# Patient Record
Sex: MALE | Race: WHITE | NOT HISPANIC OR LATINO | Employment: UNEMPLOYED | URBAN - METROPOLITAN AREA
[De-identification: names, ages, dates, MRNs, and addresses within clinical notes are randomized per-mention and may not be internally consistent; named-entity substitution may affect disease eponyms.]

---

## 2023-05-30 PROBLEM — E78.00 HYPERCHOLESTEREMIA: Status: ACTIVE | Noted: 2023-05-30

## 2023-05-30 PROBLEM — F90.9 ADHD: Status: ACTIVE | Noted: 2023-05-30

## 2023-05-30 PROBLEM — F32.A DEPRESSION: Status: ACTIVE | Noted: 2023-05-30

## 2023-05-30 PROBLEM — F41.1 ANXIETY, GENERALIZED: Status: ACTIVE | Noted: 2023-05-30

## 2023-05-30 PROBLEM — J34.89 NASAL LESION: Status: ACTIVE | Noted: 2023-05-30

## 2023-05-30 PROBLEM — M92.529 OSGOOD-SCHLATTER'S DISEASE: Status: ACTIVE | Noted: 2023-05-30

## 2023-05-30 PROBLEM — F84.0 AUTISM SPECTRUM DISORDER (HHS-HCC): Status: ACTIVE | Noted: 2023-05-30

## 2023-05-30 PROBLEM — F80.2 DEVELOPMENTAL LANGUAGE DISORDER WITH IMPAIRMENT OF RECEPTIVE AND EXPRESSIVE LANGUAGE: Status: ACTIVE | Noted: 2023-05-30

## 2023-05-30 PROBLEM — J30.9 ALLERGIC RHINITIS: Status: ACTIVE | Noted: 2023-05-30

## 2023-05-30 PROBLEM — E66.9 OBESITY: Status: ACTIVE | Noted: 2023-05-30

## 2023-05-30 RX ORDER — DULOXETINE 40 MG/1
1 CAPSULE, DELAYED RELEASE ORAL DAILY
COMMUNITY
Start: 2022-11-22 | End: 2023-05-31 | Stop reason: ALTCHOICE

## 2023-05-31 ENCOUNTER — OFFICE VISIT (OUTPATIENT)
Dept: PRIMARY CARE | Facility: CLINIC | Age: 23
End: 2023-05-31
Payer: COMMERCIAL

## 2023-05-31 VITALS
SYSTOLIC BLOOD PRESSURE: 122 MMHG | HEIGHT: 74 IN | BODY MASS INDEX: 27.21 KG/M2 | RESPIRATION RATE: 16 BRPM | TEMPERATURE: 97 F | HEART RATE: 82 BPM | DIASTOLIC BLOOD PRESSURE: 80 MMHG | WEIGHT: 212 LBS

## 2023-05-31 DIAGNOSIS — F98.5 ADULT STUTTERING: ICD-10-CM

## 2023-05-31 DIAGNOSIS — F32.A DEPRESSION, UNSPECIFIED DEPRESSION TYPE: ICD-10-CM

## 2023-05-31 DIAGNOSIS — F41.1 ANXIETY, GENERALIZED: Primary | ICD-10-CM

## 2023-05-31 DIAGNOSIS — F84.0 AUTISM SPECTRUM DISORDER (HHS-HCC): ICD-10-CM

## 2023-05-31 PROCEDURE — 1036F TOBACCO NON-USER: CPT | Performed by: FAMILY MEDICINE

## 2023-05-31 PROCEDURE — 99213 OFFICE O/P EST LOW 20 MIN: CPT | Performed by: FAMILY MEDICINE

## 2023-05-31 RX ORDER — DULOXETIN HYDROCHLORIDE 60 MG/1
60 CAPSULE, DELAYED RELEASE ORAL DAILY
COMMUNITY
Start: 2023-02-28 | End: 2023-05-31 | Stop reason: SDUPTHER

## 2023-05-31 RX ORDER — DULOXETIN HYDROCHLORIDE 60 MG/1
60 CAPSULE, DELAYED RELEASE ORAL DAILY
Qty: 30 CAPSULE | Refills: 6 | Status: SHIPPED | OUTPATIENT
Start: 2023-05-31 | End: 2023-09-25

## 2023-05-31 NOTE — PROGRESS NOTES
Naren Muller is a 22 y.o. male here today for   Chief Complaint   Patient presents with    Depression    Anxiety        Patient and his mom would like to try Vraylar add on for depression and anxiety.   His mom is a physician and we had discussed trying Vraylar as a add-on because his mood disorder is still not well controlled and he is still having a difficult time with career choices and motivation.  Vraylar is also being studied in ASD.  He is taking Duloxetine 60 mg daily.  He is still having some anxiety at times.  He is still not going to work or school.  He is sleeping OK.  I had referred him to a psychiatrist about 6 months ago and he says they called but they could not get into see the psychiatrist in [place].  They were told his partner Dr. Wilson may be able to see him.    He has also noticed a recurrence of stuttering.  He says this used to be an issue when he was in high school but it was never severe.  Now he is having stuttering language again at times.  He asked for referral to a speech therapist.    Objective    Visit Vitals  /80   Pulse 82   Temp 36.1 °C (97 °F)   Resp 16     Body mass index is 27.22 kg/m².     Physical Exam   General - Not in acute distress and cooperative.  Build & Nutrition - Well developed  Posture - Normal  Gait - Normal  Mental Status - alert and oriented x 3    Head - Normocephalic    Eyes - Bilateral - Sclera clear and lids pink without edema or mass.      Skin - Warm and dry with no rashes on visible skin    Neuropsychiatric - normal mood and affect, well groomed and good eye contact.  Able to articulate well with normal speech/language, rate and coherence.  Associations are intact.  No evidence of hallucinations, delusions, obsessions or homicidal/suicidal ideation.  Attention span and ability to concentrate are normal.    Assessment    1. Anxiety, generalized  cariprazine (Vraylar) 1.5 mg capsule    DULoxetine (Cymbalta) 60 mg DR capsule    Referral to Psychiatry    We are going to add on Vraylar 1.5 mg daily because of poorly controlled mood disorder.  He will continue the duloxetine 60 mg daily.  I will refer him to psychiatry and they are going to try to get an appointment with the psychiatrist in [place].  He can follow-up with me as needed because I will turn this over to the psychiatrist care once he is established.     2. Depression, unspecified depression type  cariprazine (Vraylar) 1.5 mg capsule    DULoxetine (Cymbalta) 60 mg DR capsule    Referral to Psychiatry      3. Autism spectrum disorder  Referral to Psychiatry      4. Adult stuttering  Referral to Speech Therapy   I will refer him to speech therapy for further evaluation and therapy.

## 2023-06-27 ENCOUNTER — OFFICE VISIT (OUTPATIENT)
Dept: PRIMARY CARE | Facility: CLINIC | Age: 23
End: 2023-06-27
Payer: COMMERCIAL

## 2023-06-27 VITALS
TEMPERATURE: 97.6 F | DIASTOLIC BLOOD PRESSURE: 50 MMHG | SYSTOLIC BLOOD PRESSURE: 106 MMHG | HEIGHT: 75 IN | WEIGHT: 216 LBS | BODY MASS INDEX: 26.86 KG/M2 | HEART RATE: 92 BPM

## 2023-06-27 DIAGNOSIS — J02.9 SORE THROAT: Primary | ICD-10-CM

## 2023-06-27 DIAGNOSIS — R42 DIZZINESS: ICD-10-CM

## 2023-06-27 LAB — POC RAPID STREP: NEGATIVE

## 2023-06-27 PROCEDURE — 87880 STREP A ASSAY W/OPTIC: CPT | Performed by: FAMILY MEDICINE

## 2023-06-27 PROCEDURE — 1036F TOBACCO NON-USER: CPT | Performed by: FAMILY MEDICINE

## 2023-06-27 PROCEDURE — 87635 SARS-COV-2 COVID-19 AMP PRB: CPT

## 2023-06-27 PROCEDURE — 99213 OFFICE O/P EST LOW 20 MIN: CPT | Performed by: FAMILY MEDICINE

## 2023-06-27 ASSESSMENT — ENCOUNTER SYMPTOMS
COUGH: 0
NECK PAIN: 0
HOARSE VOICE: 0
SWOLLEN GLANDS: 0
TROUBLE SWALLOWING: 1
HEADACHES: 1
SORE THROAT: 1
SHORTNESS OF BREATH: 0

## 2023-06-27 NOTE — PROGRESS NOTES
"Subjective   Patient ID: Naren Muller is a 22 y.o. male who presents for Dizziness and Sore Throat.    Dizziness this am     Sore Throat   The current episode started yesterday. The problem has been gradually worsening. There has been no fever. The pain is moderate. Associated symptoms include headaches and trouble swallowing. Pertinent negatives include no congestion, coughing, ear discharge, ear pain, hoarse voice, neck pain, shortness of breath or swollen glands. He has had no exposure to strep or mono.        Review of Systems   HENT:  Positive for sore throat and trouble swallowing. Negative for congestion, ear discharge, ear pain and hoarse voice.    Respiratory:  Negative for cough and shortness of breath.    Musculoskeletal:  Negative for neck pain.   Neurological:  Positive for headaches.       Objective   /50 (BP Location: Right arm, Patient Position: Sitting, BP Cuff Size: Adult)   Pulse 92   Temp 36.4 °C (97.6 °F) (Temporal)   Ht 1.892 m (6' 2.5\")   Wt 98 kg (216 lb)   BMI 27.36 kg/m²     Physical Exam  Vitals and nursing note reviewed.   Constitutional:       General: He is not in acute distress.     Appearance: Normal appearance. He is not ill-appearing.   HENT:      Head: Normocephalic and atraumatic.      Right Ear: Tympanic membrane, ear canal and external ear normal. Tympanic membrane is not perforated, erythematous, retracted or bulging.      Left Ear: Tympanic membrane, ear canal and external ear normal. Tympanic membrane is not perforated, erythematous, retracted or bulging.      Mouth/Throat:      Mouth: Mucous membranes are moist.      Pharynx: Oropharynx is clear.   Eyes:      General:         Right eye: No hordeolum.         Left eye: No hordeolum.      Conjunctiva/sclera: Conjunctivae normal.      Right eye: Right conjunctiva is not injected. No exudate.     Left eye: Left conjunctiva is not injected. No exudate.  Cardiovascular:      Rate and Rhythm: Normal rate and regular " rhythm.   Pulmonary:      Effort: Pulmonary effort is normal. No tachypnea.      Breath sounds: Normal breath sounds. No decreased air movement.   Lymphadenopathy:      Cervical:      Right cervical: No superficial cervical adenopathy.     Left cervical: No superficial cervical adenopathy.   Skin:     General: Skin is warm.      Findings: No rash.   Neurological:      Mental Status: He is alert.   Psychiatric:         Mood and Affect: Mood and affect normal.         Assessment/Plan   Problem List Items Addressed This Visit    None  Visit Diagnoses       Sore throat    -  Primary    Relevant Orders    POCT rapid strep A manually resulted (Completed)    Sars-CoV-2 PCR, Symptomatic    Dizziness

## 2023-06-28 LAB — SARS-COV-2 RESULT: DETECTED

## 2023-08-06 DIAGNOSIS — F41.1 ANXIETY, GENERALIZED: ICD-10-CM

## 2023-08-06 DIAGNOSIS — F32.A DEPRESSION, UNSPECIFIED DEPRESSION TYPE: ICD-10-CM

## 2023-08-11 RX ORDER — CARIPRAZINE 1.5 MG/1
1.5 CAPSULE, GELATIN COATED ORAL DAILY
Qty: 30 CAPSULE | Refills: 1 | Status: SHIPPED | OUTPATIENT
Start: 2023-08-11

## 2023-09-25 DIAGNOSIS — F41.1 ANXIETY, GENERALIZED: ICD-10-CM

## 2023-09-25 DIAGNOSIS — F32.A DEPRESSION, UNSPECIFIED DEPRESSION TYPE: ICD-10-CM

## 2023-09-25 RX ORDER — DULOXETIN HYDROCHLORIDE 60 MG/1
60 CAPSULE, DELAYED RELEASE ORAL DAILY
Qty: 90 CAPSULE | Refills: 0 | Status: SHIPPED | OUTPATIENT
Start: 2023-09-25

## 2025-06-24 ENCOUNTER — OFFICE VISIT (OUTPATIENT)
Dept: PRIMARY CARE | Facility: CLINIC | Age: 25
End: 2025-06-24
Payer: COMMERCIAL

## 2025-06-24 VITALS
HEART RATE: 78 BPM | SYSTOLIC BLOOD PRESSURE: 122 MMHG | RESPIRATION RATE: 18 BRPM | BODY MASS INDEX: 24.74 KG/M2 | HEIGHT: 75 IN | TEMPERATURE: 98 F | WEIGHT: 199 LBS | DIASTOLIC BLOOD PRESSURE: 80 MMHG

## 2025-06-24 DIAGNOSIS — F41.1 GENERALIZED ANXIETY DISORDER: Primary | ICD-10-CM

## 2025-06-24 DIAGNOSIS — F90.2 ATTENTION DEFICIT HYPERACTIVITY DISORDER (ADHD), COMBINED TYPE: ICD-10-CM

## 2025-06-24 PROCEDURE — 3008F BODY MASS INDEX DOCD: CPT | Performed by: FAMILY MEDICINE

## 2025-06-24 PROCEDURE — 99214 OFFICE O/P EST MOD 30 MIN: CPT | Performed by: FAMILY MEDICINE

## 2025-06-24 RX ORDER — GUANFACINE 2 MG/1
2 TABLET ORAL NIGHTLY
Qty: 30 TABLET | Refills: 6 | Status: SHIPPED | OUTPATIENT
Start: 2025-06-24 | End: 2026-06-24

## 2025-06-24 RX ORDER — PAROXETINE 40 MG/1
40 TABLET, FILM COATED ORAL EVERY MORNING
Qty: 30 TABLET | Refills: 6 | Status: SHIPPED | OUTPATIENT
Start: 2025-06-24

## 2025-06-24 ASSESSMENT — PATIENT HEALTH QUESTIONNAIRE - PHQ9
SUM OF ALL RESPONSES TO PHQ QUESTIONS 1-9: 19
7. TROUBLE CONCENTRATING ON THINGS, SUCH AS READING THE NEWSPAPER OR WATCHING TELEVISION: NEARLY EVERY DAY
3. TROUBLE FALLING OR STAYING ASLEEP OR SLEEPING TOO MUCH: SEVERAL DAYS
2. FEELING DOWN, DEPRESSED OR HOPELESS: NEARLY EVERY DAY
5. POOR APPETITE OR OVEREATING: MORE THAN HALF THE DAYS
4. FEELING TIRED OR HAVING LITTLE ENERGY: NEARLY EVERY DAY
SUM OF ALL RESPONSES TO PHQ9 QUESTIONS 1 AND 2: 5
6. FEELING BAD ABOUT YOURSELF - OR THAT YOU ARE A FAILURE OR HAVE LET YOURSELF OR YOUR FAMILY DOWN: MORE THAN HALF THE DAYS
8. MOVING OR SPEAKING SO SLOWLY THAT OTHER PEOPLE COULD HAVE NOTICED. OR THE OPPOSITE, BEING SO FIGETY OR RESTLESS THAT YOU HAVE BEEN MOVING AROUND A LOT MORE THAN USUAL: NEARLY EVERY DAY
1. LITTLE INTEREST OR PLEASURE IN DOING THINGS: MORE THAN HALF THE DAYS
9. THOUGHTS THAT YOU WOULD BE BETTER OFF DEAD, OR OF HURTING YOURSELF: NOT AT ALL

## 2025-06-24 ASSESSMENT — ANXIETY QUESTIONNAIRES
3. WORRYING TOO MUCH ABOUT DIFFERENT THINGS: NEARLY EVERY DAY
2. NOT BEING ABLE TO STOP OR CONTROL WORRYING: NEARLY EVERY DAY
GAD7 TOTAL SCORE: 17
1. FEELING NERVOUS, ANXIOUS, OR ON EDGE: NEARLY EVERY DAY
6. BECOMING EASILY ANNOYED OR IRRITABLE: SEVERAL DAYS
7. FEELING AFRAID AS IF SOMETHING AWFUL MIGHT HAPPEN: SEVERAL DAYS
5. BEING SO RESTLESS THAT IT IS HARD TO SIT STILL: NEARLY EVERY DAY
4. TROUBLE RELAXING: NEARLY EVERY DAY
IF YOU CHECKED OFF ANY PROBLEMS ON THIS QUESTIONNAIRE, HOW DIFFICULT HAVE THESE PROBLEMS MADE IT FOR YOU TO DO YOUR WORK, TAKE CARE OF THINGS AT HOME, OR GET ALONG WITH OTHER PEOPLE: EXTREMELY DIFFICULT

## 2025-06-24 NOTE — ASSESSMENT & PLAN NOTE
Patient's generalized anxiety disorder is not well-controlled and he has not been on medications for about a year.  I am going to restart paroxetine 40 mg daily.  He plans to make an appointment with his counselor at [facility].  We discussed causes of anxiety, symptoms, panic attacks, medications and possible side effects in detail.  Discussed variable possible courses of this disease and potential outcomes.  Reinforced self-control and techniques to help.  We will follow-up in 1 month for recheck.  Orders:    PARoxetine (Paxil) 40 mg tablet; Take 1 tablet (40 mg) by mouth once daily in the morning.    Referral to Psychology; Future

## 2025-06-24 NOTE — ASSESSMENT & PLAN NOTE
Patient's ADHD is also not well-controlled and we are going to restart guanfacine at 2 mg daily.  He will see his counselor as above.  Orders:    guanFACINE (Tenex) 2 mg tablet; Take 1 tablet (2 mg) by mouth once daily at bedtime.    Referral to Psychology; Future

## 2025-06-24 NOTE — PROGRESS NOTES
"Naren Muller is a 24 y.o. male here today for   Chief Complaint   Patient presents with    Anxiety    Depression        HPI     I last saw the patient 2 years ago on 5/31/2023 for recheck of anxiety and depression.  He was on duloxetine at that time and I added Vraylar 1.5 mg for possible bipolar disorder.  I have not seen the patient in recheck since then.      He says anxiety and depression are not doing well over the last 2 years.  He says he is still stuck because he cannot control the anxiety feelings.  He is still having constant anxiety.  Trouble falling and staying asleep.      He did see a psychiatrist Dr. Wong - for a year but not for one year now.   He tried multiple medications.  Was seeing a counselor too.  I cannot see notes.  Not sure of final diagnosis.  Was on Guanfacine 4 mg daily, Paroxetine 40 mg daily.  Patient brought in medications in a bag.  Also had Cymbalta, Vyvanse from me in the past.      He denies any SI.  He is not currently working.  Lives with parents still.  He is still unsure as to his future and he is too anxious to look for a job or consider going back to college.  He says he has been walking a lot to try to stay active.      Current Outpatient Medications   Medication Instructions    guanFACINE (TENEX) 2 mg, oral, Nightly    PARoxetine (PAXIL) 40 mg, oral, Every morning       Patient Active Problem List    Diagnosis Date Noted    Anxiety, generalized 05/30/2023    Autism spectrum disorder ([facility]) 05/30/2023    Depression 05/30/2023    ADHD 05/30/2023    Allergic rhinitis 05/30/2023    Developmental language disorder with impairment of receptive and expressive language 05/30/2023    Hypercholesteremia 05/30/2023    Nasal lesion 05/30/2023    Obesity 05/30/2023    Osgood-Schlatter's disease 05/30/2023         Objective      Visit Vitals    Visit Vitals  /80   Pulse 78   Temp 36.7 °C (98 °F)   Resp 18   Ht 1.892 m (6' 2.5\")   Wt 90.3 kg (199 lb)   BMI 25.21 kg/m² "   Smoking Status Never   BSA 2.18 m²       Body mass index is 25.21 kg/m².     Physical Exam     General - Not in acute distress and cooperative.  Build & Nutrition - Well developed  Posture - Normal  Gait - Normal  Mental Status - alert and oriented x 3    Head - Normocephalic    Eyes - Bilateral - Sclera clear and lids pink without edema or mass.      Skin - Warm and dry with no rashes on visible skin    Neuropsychiatric -patient little tearful as we discussed anxiety and depression, well groomed and good eye contact.  Able to articulate well with normal speech/language, rate and coherence.  Associations are intact.  No evidence of hallucinations, delusions, obsessions or homicidal/suicidal ideation.  Attention span and ability to concentrate are normal.    Assessment & Plan  Generalized anxiety disorder  Patient's generalized anxiety disorder is not well-controlled and he has not been on medications for about a year.  I am going to restart paroxetine 40 mg daily.  He plans to make an appointment with his counselor at [facility].  We discussed causes of anxiety, symptoms, panic attacks, medications and possible side effects in detail.  Discussed variable possible courses of this disease and potential outcomes.  Reinforced self-control and techniques to help.  We will follow-up in 1 month for recheck.  Orders:    PARoxetine (Paxil) 40 mg tablet; Take 1 tablet (40 mg) by mouth once daily in the morning.    Referral to Psychology; Future    Attention deficit hyperactivity disorder (ADHD), combined type  Patient's ADHD is also not well-controlled and we are going to restart guanfacine at 2 mg daily.  He will see his counselor as above.  Orders:    guanFACINE (Tenex) 2 mg tablet; Take 1 tablet (2 mg) by mouth once daily at bedtime.    Referral to Psychology; Future         Orders Placed This Encounter      guanFACINE (Tenex) 2 mg tablet      PARoxetine (Paxil) 40 mg tablet       Orders Placed This Encounter    Procedures    Referral to Psychology        New Medications Ordered This Visit   Medications    PARoxetine (Paxil) 40 mg tablet     Sig: Take 1 tablet (40 mg) by mouth once daily in the morning.     Dispense:  30 tablet     Refill:  6    guanFACINE (Tenex) 2 mg tablet     Sig: Take 1 tablet (2 mg) by mouth once daily at bedtime.     Dispense:  30 tablet     Refill:  6

## 2025-07-24 ENCOUNTER — APPOINTMENT (OUTPATIENT)
Dept: PRIMARY CARE | Facility: CLINIC | Age: 25
End: 2025-07-24
Payer: COMMERCIAL

## 2025-07-24 VITALS
DIASTOLIC BLOOD PRESSURE: 64 MMHG | WEIGHT: 197 LBS | HEIGHT: 74 IN | TEMPERATURE: 97 F | HEART RATE: 68 BPM | RESPIRATION RATE: 18 BRPM | BODY MASS INDEX: 25.28 KG/M2 | SYSTOLIC BLOOD PRESSURE: 104 MMHG

## 2025-07-24 DIAGNOSIS — F90.0 ADHD (ATTENTION DEFICIT HYPERACTIVITY DISORDER), INATTENTIVE TYPE: Primary | ICD-10-CM

## 2025-07-24 DIAGNOSIS — F84.0 AUTISM SPECTRUM DISORDER (HHS-HCC): ICD-10-CM

## 2025-07-24 DIAGNOSIS — F32.A DEPRESSION, UNSPECIFIED DEPRESSION TYPE: ICD-10-CM

## 2025-07-24 DIAGNOSIS — F41.1 ANXIETY, GENERALIZED: ICD-10-CM

## 2025-07-24 PROCEDURE — 99214 OFFICE O/P EST MOD 30 MIN: CPT | Performed by: FAMILY MEDICINE

## 2025-07-24 PROCEDURE — 3008F BODY MASS INDEX DOCD: CPT | Performed by: FAMILY MEDICINE

## 2025-07-24 RX ORDER — LISDEXAMFETAMINE DIMESYLATE 30 MG/1
30 CAPSULE ORAL EVERY MORNING
Qty: 30 CAPSULE | Refills: 0 | Status: SHIPPED | OUTPATIENT
Start: 2025-07-24 | End: 2025-08-23

## 2025-07-24 ASSESSMENT — PATIENT HEALTH QUESTIONNAIRE - PHQ9
1. LITTLE INTEREST OR PLEASURE IN DOING THINGS: NOT AT ALL
2. FEELING DOWN, DEPRESSED OR HOPELESS: NOT AT ALL
SUM OF ALL RESPONSES TO PHQ9 QUESTIONS 1 AND 2: 0

## 2025-07-24 NOTE — ASSESSMENT & PLAN NOTE
This seems to have definitely improved since starting paroxetine.  We will continue at 40 mg daily.

## 2025-07-24 NOTE — PROGRESS NOTES
"Naren Muller is a 24 y.o. male here today for   Chief Complaint   Patient presents with    Anxiety    Depression        HPI     Anxiety seems to have improved with Paxil.  He says he feels much less anxious.  He feels like he has much better control over his worries and is not overthinking things nearly as much.  He reports no problems with the Paxil although at first it did make him feel sleepy but this resolved after 1 to 2 weeks.    Guanfacine is causing daytime somnolence.  So he cut down to 1/2 tab at bedtime.  He is having trouble with focus and concentration and motivation.  He was previously on meds for ADHD and would like to try again to help with these issues.  He does not feel like the guanfacine really helped with this at all.    He has been looking at possible career path a little more and is considering some online training for  jobs.      Patient Active Problem List    Diagnosis Date Noted    Anxiety, generalized 05/30/2023    [facility]ism spectrum disorder ([facility]) 05/30/2023    Depression 05/30/2023    ADHD (attention deficit hyperactivity disorder), inattentive type 07/24/2025    ADHD 05/30/2023    Allergic rhinitis 05/30/2023    Developmental language disorder with impairment of receptive and expressive language 05/30/2023    Hypercholesteremia 05/30/2023    Nasal lesion 05/30/2023    Obesity 05/30/2023    Osgood-Schlatter's disease 05/30/2023           Current Outpatient Medications   Medication Instructions    lisdexamfetamine (VYVANSE) 30 mg, oral, Every morning    PARoxetine (PAXIL) 40 mg, oral, Every morning       Objective      Visit Vitals    Visit Vitals  /64   Pulse 68   Temp 36.1 °C (97 °F)   Resp 18   Ht 1.88 m (6' 2\")   Wt 89.4 kg (197 lb)   BMI 25.29 kg/m²   Smoking Status Never   BSA 2.16 m²         Physical Exam     General - Not in acute distress and cooperative.  Build & Nutrition - Well developed  Posture - Normal  Gait - Normal  Mental Status - alert and oriented " x 3    Head - Normocephalic    Eyes - Bilateral - Sclera clear and lids pink without edema or mass.      Skin - Warm and dry with no rashes on visible skin    Neuropsychiatric - normal mood and affect, well groomed and good eye contact.  Able to articulate well with normal speech/language, rate and coherence.  Associations are intact.  No evidence of hallucinations, delusions, obsessions or homicidal/suicidal ideation.  Attention span and ability to concentrate are normal.    Assessment & Plan  ADHD (attention deficit hyperactivity disorder), inattentive type  I think restarting Vyvanse is reasonable because the patient definitely has continued trouble with focus and concentration and staying on task.  This definitely seem to help him during his college time.  We will restart at 30 mg daily.  I have personally reviewed the [place]ARRS report for this patient. This report is scanned into the electronic medical record. I have considered the risks of abuse, dependence, addiction and diversion.      Reviewed [facility] controlled substance policies with patient or caregiver (as defined in the Controlled Substance Agreement).  I will not refill medications early or replace lost medications.  Will not refill medications if does not come in for scheduled rechecks.  We can never call in or refill medications on weekends or after hours.  Patient will submit to urine tox screen at least yearly and more at my discretion.  The patient will be required to sign a controlled substance agreement yearly.  If any screens are abnormal in any way, I will no longer prescribe controlled substances.  Patient or caregiver understands and agrees.      A urine drug screen was obtained today and a controlled substance agreement was signed.  We will follow-up in 3 weeks for recheck.  Orders:    lisdexamfetamine (Vyvanse) 30 mg capsule; Take 1 capsule (30 mg) by mouth once daily in the morning.    Drug Screen, Urine With Reflex to  Confirmation    Anxiety, generalized  This seems to have definitely improved since starting paroxetine.  We will continue at 40 mg daily.       Autism spectrum disorder ([facility])         Depression, unspecified depression type  As above.            Orders Placed This Encounter      lisdexamfetamine (Vyvanse) 30 mg capsule       Orders Placed This Encounter   Procedures    Drug Screen, Urine With Reflex to Confirmation

## 2025-07-24 NOTE — ASSESSMENT & PLAN NOTE
I think restarting Vyvanse is reasonable because the patient definitely has continued trouble with focus and concentration and staying on task.  This definitely seem to help him during his college time.  We will restart at 30 mg daily.  I have personally reviewed the OARRS report for this patient. This report is scanned into the electronic medical record. I have considered the risks of abuse, dependence, addiction and diversion.      Reviewed [facility] controlled substance policies with patient or caregiver (as defined in the Controlled Substance Agreement).  I will not refill medications early or replace lost medications.  Will not refill medications if does not come in for scheduled rechecks.  We can never call in or refill medications on weekends or after hours.  Patient will submit to urine tox screen at least yearly and more at my discretion.  The patient will be required to sign a controlled substance agreement yearly.  If any screens are abnormal in any way, I will no longer prescribe controlled substances.  Patient or caregiver understands and agrees.      A urine drug screen was obtained today and a controlled substance agreement was signed.  We will follow-up in 3 weeks for recheck.  Orders:    lisdexamfetamine (Vyvanse) 30 mg capsule; Take 1 capsule (30 mg) by mouth once daily in the morning.    Drug Screen, Urine With Reflex to Confirmation

## 2025-07-25 LAB
AMPHETAMINES UR QL: NEGATIVE NG/ML
BARBITURATES UR QL: NEGATIVE NG/ML
BENZODIAZ UR QL: NEGATIVE NG/ML
BZE UR QL: NEGATIVE NG/ML
CREAT UR-MCNC: 148.9 MG/DL
FENTANYL UR QL SCN: NEGATIVE NG/ML
METHADONE UR QL: NEGATIVE NG/ML
OPIATES UR QL: NEGATIVE NG/ML
OXIDANTS UR QL: NEGATIVE MCG/ML
OXYCODONE UR QL: NEGATIVE NG/ML
PCP UR QL: NEGATIVE NG/ML
PH UR: 7.5 [PH] (ref 4.5–9)
QUEST NOTES AND COMMENTS: NORMAL
THC UR QL: NEGATIVE NG/ML

## 2025-08-14 ENCOUNTER — APPOINTMENT (OUTPATIENT)
Dept: PRIMARY CARE | Facility: CLINIC | Age: 25
End: 2025-08-14
Payer: COMMERCIAL

## 2025-08-14 VITALS
RESPIRATION RATE: 16 BRPM | HEIGHT: 74 IN | WEIGHT: 202 LBS | BODY MASS INDEX: 25.93 KG/M2 | DIASTOLIC BLOOD PRESSURE: 70 MMHG | SYSTOLIC BLOOD PRESSURE: 118 MMHG | HEART RATE: 72 BPM | TEMPERATURE: 97.9 F

## 2025-08-14 DIAGNOSIS — F41.1 GENERALIZED ANXIETY DISORDER: ICD-10-CM

## 2025-08-14 DIAGNOSIS — F41.1 ANXIETY, GENERALIZED: Primary | ICD-10-CM

## 2025-08-14 DIAGNOSIS — F90.0 ADHD (ATTENTION DEFICIT HYPERACTIVITY DISORDER), INATTENTIVE TYPE: ICD-10-CM

## 2025-08-14 PROCEDURE — 99213 OFFICE O/P EST LOW 20 MIN: CPT | Performed by: FAMILY MEDICINE

## 2025-08-14 PROCEDURE — 3008F BODY MASS INDEX DOCD: CPT | Performed by: FAMILY MEDICINE

## 2025-08-14 PROCEDURE — G8433 SCR FOR DEP NOT CPT DOC RSN: HCPCS | Performed by: FAMILY MEDICINE

## 2025-08-14 RX ORDER — LISDEXAMFETAMINE DIMESYLATE 40 MG/1
40 CAPSULE ORAL EVERY MORNING
Qty: 30 CAPSULE | Refills: 0 | Status: SHIPPED | OUTPATIENT
Start: 2025-08-14 | End: 2025-09-13

## 2025-08-14 RX ORDER — PAROXETINE 40 MG/1
40 TABLET, FILM COATED ORAL EVERY MORNING
Qty: 90 TABLET | Refills: 1 | Status: SHIPPED | OUTPATIENT
Start: 2025-08-14

## 2025-08-14 ASSESSMENT — PATIENT HEALTH QUESTIONNAIRE - PHQ9
2. FEELING DOWN, DEPRESSED OR HOPELESS: NOT AT ALL
1. LITTLE INTEREST OR PLEASURE IN DOING THINGS: NOT AT ALL
SUM OF ALL RESPONSES TO PHQ9 QUESTIONS 1 AND 2: 0

## 2025-09-22 ENCOUNTER — APPOINTMENT (OUTPATIENT)
Dept: PRIMARY CARE | Facility: CLINIC | Age: 25
End: 2025-09-22
Payer: COMMERCIAL